# Patient Record
(demographics unavailable — no encounter records)

---

## 2017-03-01 NOTE — TRIAGE
===================================

OB Triage

===================================

Datetime Report Generated by CPN: 03/01/2017 18:20

   

   

===========================

Datetime: 03/01/2017 17:30

===========================

   

 Stage of Pregnancy:  OB Triage

   

===================================

Maternal Assessment

===================================

   

 Level of Consciousness:  Fully Conscious

   

===================================

Labor Evaluation

===================================

   

 Frequency:  NONE

 Monitor Mode:  External

 Resting Tone [place]:  Relaxed

   

===================================

Fetal Heart Rate

===================================

   

 FHR Baseline Rate:  125

 Monitor Mode:  External US

 Variability:  Moderate 6-25 bpm

 Accelerations:  15X15

 Decelerations:  Variable

   

===================================

Pain Assessment

===================================

   

 Pain Scale:  3

 Pain Presence:  Constant

 Pain Type:  Ache

 Pain Location:  Abdomen

 Pain Goal:  3

 Pain Relief Measures:  Comfort Measures

   

===================================

Vaginal Exam

===================================

   

 Membrane Status:  Intact

 Vaginal Bleeding:  None

   

===========================

Datetime: 03/01/2017 16:30

===========================

   

 Stage of Pregnancy:  OB Triage

   

===================================

Maternal Assessment

===================================

   

 Level of Consciousness:  Fully Conscious

   

===================================

Labor Evaluation

===================================

   

 Frequency:  IRREGULAR

 Monitor Mode:  External

 Duration (sec)2399:  30-50

 Quality:  Mild

 Resting Tone [place]:  Relaxed

   

===================================

Fetal Heart Rate

===================================

   

 FHR Baseline Rate:  145

 Monitor Mode:  External US

 Variability:  Moderate 6-25 bpm

 Accelerations:  15X15

 Decelerations:  None

   

===================================

Pain Assessment

===================================

   

 Pain Scale:  3

 Pain Presence:  Constant

 Pain Type:  Ache

 Pain Location:  Abdomen

 Pain Goal:  3

 Pain Relief Measures:  Comfort Measures

   

===================================

Vaginal Exam

===================================

   

 Membrane Status:  Intact

 Vaginal Bleeding:  None

   

===========================

Datetime: 03/01/2017 15:24

===========================

   

 Assessment Type:  Triage

   

===================================

Maternal Assessment

===================================

   

 Level of Consciousness:  Fully Conscious

 DTR's/Clonus:  DTRs 2+; No Clonus

 Headache:  Denies

 Blurred Vision:  No

 Respiratory Effort:  Unlabored; Regular Rhythm; Equal Expansion

 Breath Sounds, Left:  Clear and Equal

 Breath Sounds, Right:  Clear and Equal

 Nausea/Vomiting:  Denies

 RUQ Epigastric Pain:  Denies

 Lower Extremities Edema:  None

     Degree:  None

 Upper Extremities Edema:  None

     Degree:  None

 Facial Edema:  None

   

===================================

Fall Risk Assessment

===================================

   

 History of Falling:  (0) No

 Secondary Diagnosis:  (0) No

 Ambulatory Aid:  (0) Bedrest/Nurse Assist

 IV Therapy:  (0) No

 Gait:  (0) Normal/Bedrest/Immobile

 Mental Status:  (0) Oriented to Own Ability

 Fall Score:  0

 Fall Risk Score Definition:  No Risk: No action required

   

===========================

Datetime: 03/01/2017 15:22

===========================

   

 Time of Arrival:  03/01/2017 15:02

 EGA:  32.2

 Arrived By:  Ambulatory

 Arrived From:   Office

 Chief Complaint:  pt sent in for eval. of maternal tachycardia

 Fetal Movement:  Present

 Contractions:  Denies/Absent

 Rupture of Membranes:  Denies

 Vaginal Bleeding:  None

 Vaginal Discharge:  Denies

 Recent Sexual Intercouse:  Denies

 Abdominal Trauma:  Not Applicable

 Patient Complaints:  None

 Additional Patient Complaints:  PT C/O OF FLU LIKE SYMPTOMS X 1 WEEK

 Provider Notified:  PAUL

 Initial Plan:  BPP, EKG, CBC, TYLENOL, ROBITUSSIN, IV HYDRATION

## 2017-03-01 NOTE — RADRPT
PROCEDURE:   US OB biophysical profile. 

 

CLINICAL INDICATION:  24-year-old female with live IUP.

 

TECHNIQUE:   Multiple sonographic images of the pelvis were obtained.  The images were reviewed on a
 PACS workstation. 

 

COMPARISON:   Pelvic sonogram 10/14/2016. 

 

FINDINGS:

 

Cardiac activity is present with 146.25 beats per minute. 

There is a transverse lie with the fetal head toward the maternal right presentation. 

The placenta is grade 1-2 fundal placenta..  

 

 

Amniotic fluid index: 16.9.  This is normal.

 

 

Biophysical profile:

Fetal movement 2/2

Fetal tone 2/2.

Fetal breathing 2/2 

JASS 2/2

 

Total 8/8 

 

IMPRESSION:

 

1. Normal biophysical profile.

2.  Amniotic fluid index 16.9 7 cm.

 

 .

_____________________________________________ 

Physician Francia           Date    Time 

Electronically viewed and signed by Physician Francia on 03/01/2017 18:04 

 

D:  03/01/2017 18:04  T:  03/01/2017 18:04

/

## 2017-03-03 NOTE — RADRPT
Vent Rate: 124 bpm

RR Interval: 0 msec

IN Interval: 136 msec

QRS Duration: 72 msec

QT Interval: 298 msec

QTC Interval: 428 msec

P-R-T Axis: 64 - 64 - 33 degrees

 

 Sinus tachycardia

 Otherwise normal ECG

 

Electronically Signed By: Surya Valerio

[id number]

## 2017-04-24 NOTE — HP
Date/Time of Note


Date/Time of Note


DATE: 17 


TIME: 19:51





OB - History


Hx of Present Pregnancy


Free Text/Dictation


admitted for elective induction


Last Menstrual Period:  2016


Estimated Due Date:  2017


:  1


Para:  0


Prenatal Care:  Good Care


Ultrasounds:  Normal mid trimester US


Obstetrical Complications:  None


Medical Complications:  None





Past Family/Social History


*


Past Medical, Surgical, Family and Obstetric Histories reviewed from prenatal 

chart.


Blood Type:  O+


Rubella:  immune


RPR/VDRL:  Negative


GBS Status:  Positive


HBsAG:  Negative





OB  Admission Exam


Vital Signs


Vital Signs





 Vital Signs








  Date Time  Temp Pulse Resp B/P Pulse Ox O2 Delivery O2 Flow Rate FiO2


 


17 17:32 98.7 100  123/78  Room Air  











Physical Exam


HEENT:  WNL


Heart:  Rhythm Normal


Lungs:  Clear, Equal


Abdomen:  WNL


Extremities:  Normal


Reflexes:  Normal


Cervical Dilatation:  None


Effacement:  0%


Station:  -3


Membranes:  Intact


Fetal Heart Rate:  130's


Accelerations:  Accelerations Present


Decelerations:  No Decelerations


Varibility:  Marked


Contractions on Admission:  None


Last 72 hours Lab Results


 CBC & BMP


17 18:30











OB  Assessment/Plan


Other Assessment:


elective induction at term


Induction Method:  per Misoprostol Protocol











CHANDNI SANCHEZ MD 2017 19:53

## 2017-04-25 NOTE — PN
Date/Time of Note


Date/Time of Note


DATE: 4/25/17 


TIME: 19:33





OB Subjective


Subjective


Subjective


C/O minima contractions





OB Objective


Objective


Objective


VSS


P/E unchanged 


Cx: long ? 1 cm posterior





OB  Assessment/Plan


Reason for admission:  induction of labor


Induction Method:  per Misoprostol Protocol











CHANDNI SANCHEZ MD Apr 25, 2017 19:34

## 2017-04-26 NOTE — LDN
Date/Time of Note


Date/Time of Note


DATE: 17 


TIME: 21:37





Delivery Summary


 of a viable infant over intact perineum


Weeks of Gestation


40


Placenta Delivered:  Spontaneously, Intact & Complete


Meconium:  none


Episiotomy:  No


Laceration repair:  


small vaginal and


hymennal laceration was


repaired wit 2 0 Vicryl


Anesthesia type:  Epidural


Estimated blood loss:  300


Sponge & Needle done & correct:  Yes


All needle counts correct:  Yes


Any foreign bodies felt in the:  No


Problems:  





Infant Delivery Information


Sex


Infant Sex:  male





Apgars


1 Minute:  9


5 Minute:  9





Suctioning


Nose & mouth suctioned at daniel:  Yes


Delee suction performed:  No





Umbilical Cord


Umbilical cord with:  3 Vessels


Cord presentations:  no nuchal cord


Cord Blood was obtained:  Yes





Mother & Baby Disposition


Disposition


Mom & Baby to Maternity; Good:  Yes (mother and baby were recovered in good 

condition )


Mom transferred to:  Other (maternity )


Baby to NICU:  No











CHANDNI SANCHEZ MD 2017 21:39

## 2017-04-26 NOTE — PN
Date/Time of Note


Date/Time of Note


DATE: 4/26/17 


TIME: 18:10





OB Subjective


Subjective


Subjective


Has epidural :





OB Objective


Objective


Objective


FHTs stable


Cx: C 5-6 -1 station





OB  Assessment/Plan


Reason for admission:  active labor, induction of labor


Induction Method:  per Pitocin Protocol











CHANDNI SANCHEZ MD Apr 26, 2017 18:11

## 2017-04-26 NOTE — DELSUM
===================================

Delivery Summary A-C

===================================

Datetime Report Generated by N: 2017 22:30

   

   

===================================

DELIVERY PERSONNEL

===================================

   

Circulator:  [place], [place]

   

===================================

MATERNAL INFORMATION

===================================

   

Delivery Anesthesia:  Epidural

Medications in Delivery:  30 UNITS OXYTOCIN  ML LR

Estimated  Blood Loss (ml):  300

Placenta Cultured:  No

Maternal Complications:  None

   

===================================

LABOR SUMMARY

===================================

   

EDC:  2017 00:00

No. Babies in Womb:  1

 Attempted:  No

Labor Anesthesia:  Epidural

   

===================================

LABOR INFORMATION

===================================

   

Reason for Induction:  Postterm

Onset of Labor:  2017 10:02 (Annotations: Data stored by [facility] on behalf of user)

Complete Dilatation:  2017 18:53

Cervical Ripening Agents:  Cervidil

Cervical Ripening Agents:  Cervidil (Annotations: REMOVED 24 HR POST PLACEMENT)

Cervical Ripening Agents:  Cervidil

Cervical Ripening Agents:  Cervidil (Annotations: IN PLACE FOR 24 HR-PLACED  @ 1815)

Cervical Ripening Agents:  Cervidil

Group B Beta Strep:  Positive

Antibiotics # of Doses:  13

Antibiotics Time of Last Dose:  2017 16:32

Steroids Given:  None

Reason Steroids Not Administered:  Not Applicable

   

===================================

MEMBRANES

===================================

   

Membranes Rupture Method:  Artificial

Rupture of Membranes:  2017 10:02

Length of Rupture (hr):  11.40

Amniotic Fluid Color:  Clear

Amniotic Fluid Amount:  Moderate

Amniotic Fluid Odor:  None

   

===================================

STAGES OF LABOR

===================================

   

Stage 1 hr:  8

Stage 1 min:  51

Stage 2 hr:  2

Stage 2 min:  33

Stage 3 hr:  0

Stage 3 min:  3

Total Time in Labor hr:  11

Total Time in Labor min:  27

   

===================================

VAGINAL DELIVERY

===================================

   

Episiotomy:  None

Laceration Extension:  First Degree

Laceration Type:  Perineal

Laceration Repair:  Yes

Initial Vag Sponge Count:  10

Final Vag Sponge Count:  10

Initial Vag Sharps Count:  1

Final Vag Sharps Count:  2

Sponge Count Correct:  Yes

Sharps Count Correct:  Yes

   

===================================

BABY A INFORMATION

===================================

   

Infant Delivery Date/Time:  2017 21:26

Method of Delivery:  Vaginal

Born in Route :  No

:  N/A

Forceps:  N/A

Vacuum Extraction:  N/A

Shoulder Dystocia :  N/A

   

===================================

SHOULDER DYSTOCIA BABY A

===================================

   

Infant Delivery Date/Time:  2017 21:26

   

===================================

PRESENTATION/POSITION BABY A

===================================

   

Presentation:  Cephalic

Cephalic Presentation:  Vertex

Breech Presentation:  N/A

   

===================================

PLACENTA INFORMATION BABY A

===================================

   

Placenta Delivery Time :  2017 21:29

Placenta Method of Delivery:  Expressed

Placenta Status:  Delivered

   

===================================

APGAR SCORES BABY A

===================================

   

Heart Rate 1 min:  >100 bpm

Resp Effort 1 min:  Good Cry

Reflex Irritability 1 min:  Cough/Sneeze/Pulls Away

Muscle Tone 1 min:  Active Motion

Color 1 min:  Body Pink, Extremit Blue

Resuscitation Effort 1 min:  Tactile Stimulation

APGAR SCORE 1 MIN:  9

Heart Rate 5 min:  >100 bpm

Resp Effort 5 min:  Good Cry

Reflex Irritability 5 min:  Cough/Sneeze/Pulls Away

Muscle Tone 5 min:  Active Motion

Color 5 min:  Body Pink, Extremit Blue

Resuscitation Effort 5 min:  Tactile Stimulation

APGAR SCORE 5 MIN:  9

   

===================================

INFANT INFORMATION BABY A

===================================

   

Gestational Age at Delivery:  40.2

Gestational Status:  Full Term- 39- 40.6 Weeks

Infant Outcome :  Liveborn

Infant Condition :  Stable

Infant Sex:  Male

   

===================================

IDENTIFICATION/MEDS BABY A

===================================

   

ID Band Number:  [id number]

ID Band Location:  Right Leg; Left Arm

Sensor Number:  [id number]

Sensor Location :  Cord Clamp

Vitamin K Given :  Not Given

Erythromycin Given:  Not Given

   

===================================

WEIGHT/LENGTH BABY A

===================================

   

Infant Birthweight (gm):  3095

Infant Weight (lb):  6

Infant Weight (oz):  13

Infant Length (in):  18.75

Infant Length (cm):  47.63

   

===================================

CORD INFORMATION BABY A

===================================

   

No. Cord Vessels:  3

Nuchal Cord :  N/A

Cord Blood Taken:  Yes

Infant Suction:  Mouth; Nose

   

===================================

ASSESSMENT BABY A

===================================

   

Infant Complications:  None

Physical Findings at Delivery:  Within Normal Limits

Infant Respirations:  Appears Normal

Neonatologist/ALS Called :  No

Infant Care By:  JOHNATHON NAYLOR

Transferred To:  Remains with Mother

## 2017-04-26 NOTE — RADRPT
PROCEDURE:   US OB. 

 

CLINICAL INDICATION:    Induction. 

 

TECHNIQUE:   Multiple sonographic images of the pelvis were obtained.  The images were reviewed on a
 PACS workstation. 

 

COMPARISON:   No prior studies are available for comparison. 

 

FINDINGS:

The cervical length is not evaluated.

There is a single viable intrauterine gestation.  Cardiac activity is present with 143 beats per min
Torres Martinez. 

There is a cephalic presentation. 

 

Measurements were made in order to determine fetal age. The results are as follows:

BPD =9.1 cm36 weeks 5 days plus or minus 3 weeks 1 day.  

HC =33.2 cm37-week 6 days plus or minus 2 weeks 5 days.

AC =34 cm37 weeks 5 days plus or minus 3 weeks 0 days.

FL =7.7 cm39 weeks 3 days plus/minus 3 weeks 1 day.

 

.

Estimated gestational age of approximately 38 weeks 0 days plus or minus 2 weeks 5 days.

 

.  

The estimated date of delivery is 05/10/2017..  

The EFW = 3362 g plus or minus 504 g.

 

 .  

Fetal anatomy is not evaluated.

 

 

The placenta is left bundle grade 2 placenta.. There is no evidence for an abruption or placenta pre
via.

 

 

Amniotic fluid is reduced.  Lead no hydramnios.

 

There are no adnexal masses..  

 

IMPRESSION:

 

1.  Oligohydramnios.

2.  A single viable intrauterine gestation is identified presenting in a cephalic lie with a heart r
ate of 142 beats per minute.

 

RPTAT:AAJJ

_____________________________________________ 

Physician Francia           Date    Time 

Electronically viewed and signed by Physician Francia on 04/26/2017 16:57 

 

D:  04/26/2017 16:57  T:  04/26/2017 16:57

DAVE/

## 2017-04-27 NOTE — DS
Date/Time of Note


Date/Time of Note


home next day


DATE: 4/27/17 


TIME: 14:19





Obstetrical Discharge Record


Final Diagnosis


Final Diagnosis:  Term delivered





Vaginal Delivery


Obstetrical Delivery:  Spontaneous, Laceration, Repaired





Complications


Augmentation:  Yes


Induction:  Yes





Condition on Discharge


Physical Assessment


Last Vitals:


see nurses notes


Voiding:  Yes


Bowel Movement:  Yes


Breast:  Soft, non-tender, Filling


Fundus:  Firm


Abdomen and Incision:


soft BS +


Episiotomy:


NA


Calf Tenderness:  No


Patient Condition:  Good











CHANDNI SANCHEZ MD Apr 27, 2017 14:20

## 2017-04-27 NOTE — PD.PPDC
OB/GYN Discharge Instruction


Provider Information


Physician Information


25 y/o female had vagina; delivery





Diagnosis


Final Diagnosis:  S/P vaginal delivery





Condition


Patient Condition:  Good





Diet


Diet:  Resume Regular Diet





Activity/Restrictions








Activity:   Normal Activity





 May Shower














Restrictions:   Nothing in the Vagina





Return to Work or School:  Jun 12, 2017





Follow-up


Follow-up with Physician:  4, Week/Weeks (in clinic)





Return to clinic for








OB Instructions:   Breast Tenderness





 Depression

















CHANDNI SANCHEZ MD Apr 27, 2017 14:21